# Patient Record
Sex: MALE | NOT HISPANIC OR LATINO | Employment: FULL TIME
[De-identification: names, ages, dates, MRNs, and addresses within clinical notes are randomized per-mention and may not be internally consistent; named-entity substitution may affect disease eponyms.]

---

## 2020-03-02 ENCOUNTER — HEALTH MAINTENANCE LETTER (OUTPATIENT)
Age: 50
End: 2020-03-02

## 2020-12-14 ENCOUNTER — HEALTH MAINTENANCE LETTER (OUTPATIENT)
Age: 50
End: 2020-12-14

## 2021-04-18 ENCOUNTER — HEALTH MAINTENANCE LETTER (OUTPATIENT)
Age: 51
End: 2021-04-18

## 2021-10-02 ENCOUNTER — HEALTH MAINTENANCE LETTER (OUTPATIENT)
Age: 51
End: 2021-10-02

## 2022-05-14 ENCOUNTER — HEALTH MAINTENANCE LETTER (OUTPATIENT)
Age: 52
End: 2022-05-14

## 2022-09-03 ENCOUNTER — HEALTH MAINTENANCE LETTER (OUTPATIENT)
Age: 52
End: 2022-09-03

## 2023-06-03 ENCOUNTER — HEALTH MAINTENANCE LETTER (OUTPATIENT)
Age: 53
End: 2023-06-03

## 2023-06-25 ENCOUNTER — APPOINTMENT (OUTPATIENT)
Dept: CT IMAGING | Facility: CLINIC | Age: 53
End: 2023-06-25
Attending: EMERGENCY MEDICINE
Payer: COMMERCIAL

## 2023-06-25 ENCOUNTER — HOSPITAL ENCOUNTER (EMERGENCY)
Facility: CLINIC | Age: 53
Discharge: HOME | End: 2023-06-25
Attending: EMERGENCY MEDICINE | Admitting: EMERGENCY MEDICINE
Payer: COMMERCIAL

## 2023-06-25 VITALS
OXYGEN SATURATION: 96 % | TEMPERATURE: 97.7 F | DIASTOLIC BLOOD PRESSURE: 73 MMHG | HEART RATE: 61 BPM | RESPIRATION RATE: 16 BRPM | SYSTOLIC BLOOD PRESSURE: 116 MMHG

## 2023-06-25 DIAGNOSIS — G44.209 ACUTE NON INTRACTABLE TENSION-TYPE HEADACHE: ICD-10-CM

## 2023-06-25 LAB
ANION GAP SERPL CALCULATED.3IONS-SCNC: 10 MMOL/L (ref 7–15)
BASOPHILS # BLD AUTO: 0.1 10E3/UL (ref 0–0.2)
BASOPHILS NFR BLD AUTO: 1 %
BUN SERPL-MCNC: 19 MG/DL (ref 6–20)
CALCIUM SERPL-MCNC: 8.6 MG/DL (ref 8.6–10)
CHLORIDE SERPL-SCNC: 99 MMOL/L (ref 98–107)
CREAT SERPL-MCNC: 1.09 MG/DL (ref 0.67–1.17)
DEPRECATED HCO3 PLAS-SCNC: 25 MMOL/L (ref 22–29)
EOSINOPHIL # BLD AUTO: 0.2 10E3/UL (ref 0–0.7)
EOSINOPHIL NFR BLD AUTO: 4 %
ERYTHROCYTE [DISTWIDTH] IN BLOOD BY AUTOMATED COUNT: 11.8 % (ref 10–15)
GFR SERPL CREATININE-BSD FRML MDRD: 81 ML/MIN/1.73M2
GLUCOSE SERPL-MCNC: 139 MG/DL (ref 70–99)
HCT VFR BLD AUTO: 40.6 % (ref 40–53)
HGB BLD-MCNC: 13.7 G/DL (ref 13.3–17.7)
IMM GRANULOCYTES # BLD: 0 10E3/UL
IMM GRANULOCYTES NFR BLD: 0 %
LYMPHOCYTES # BLD AUTO: 2 10E3/UL (ref 0.8–5.3)
LYMPHOCYTES NFR BLD AUTO: 43 %
MCH RBC QN AUTO: 30.4 PG (ref 26.5–33)
MCHC RBC AUTO-ENTMCNC: 33.7 G/DL (ref 31.5–36.5)
MCV RBC AUTO: 90 FL (ref 78–100)
MONOCYTES # BLD AUTO: 0.6 10E3/UL (ref 0–1.3)
MONOCYTES NFR BLD AUTO: 12 %
NEUTROPHILS # BLD AUTO: 1.8 10E3/UL (ref 1.6–8.3)
NEUTROPHILS NFR BLD AUTO: 40 %
NRBC # BLD AUTO: 0 10E3/UL
NRBC BLD AUTO-RTO: 0 /100
PLATELET # BLD AUTO: 181 10E3/UL (ref 150–450)
POTASSIUM SERPL-SCNC: 4.1 MMOL/L (ref 3.4–5.3)
RBC # BLD AUTO: 4.5 10E6/UL (ref 4.4–5.9)
SODIUM SERPL-SCNC: 134 MMOL/L (ref 136–145)
WBC # BLD AUTO: 4.6 10E3/UL (ref 4–11)

## 2023-06-25 PROCEDURE — 96374 THER/PROPH/DIAG INJ IV PUSH: CPT | Mod: 59

## 2023-06-25 PROCEDURE — 250N000009 HC RX 250: Performed by: EMERGENCY MEDICINE

## 2023-06-25 PROCEDURE — 70496 CT ANGIOGRAPHY HEAD: CPT

## 2023-06-25 PROCEDURE — 99285 EMERGENCY DEPT VISIT HI MDM: CPT | Mod: 25

## 2023-06-25 PROCEDURE — 36415 COLL VENOUS BLD VENIPUNCTURE: CPT | Performed by: EMERGENCY MEDICINE

## 2023-06-25 PROCEDURE — 85025 COMPLETE CBC W/AUTO DIFF WBC: CPT | Performed by: EMERGENCY MEDICINE

## 2023-06-25 PROCEDURE — 70498 CT ANGIOGRAPHY NECK: CPT

## 2023-06-25 PROCEDURE — 70450 CT HEAD/BRAIN W/O DYE: CPT

## 2023-06-25 PROCEDURE — 96375 TX/PRO/DX INJ NEW DRUG ADDON: CPT | Mod: 59

## 2023-06-25 PROCEDURE — 250N000011 HC RX IP 250 OP 636: Mod: JZ | Performed by: EMERGENCY MEDICINE

## 2023-06-25 PROCEDURE — 250N000011 HC RX IP 250 OP 636: Performed by: EMERGENCY MEDICINE

## 2023-06-25 PROCEDURE — 80048 BASIC METABOLIC PNL TOTAL CA: CPT | Performed by: EMERGENCY MEDICINE

## 2023-06-25 RX ORDER — SUMATRIPTAN 25 MG/1
25 TABLET, FILM COATED ORAL
Qty: 10 TABLET | Refills: 0 | Status: SHIPPED | OUTPATIENT
Start: 2023-06-25

## 2023-06-25 RX ORDER — IOPAMIDOL 755 MG/ML
75 INJECTION, SOLUTION INTRAVASCULAR ONCE
Status: COMPLETED | OUTPATIENT
Start: 2023-06-25 | End: 2023-06-25

## 2023-06-25 RX ORDER — DIPHENHYDRAMINE HYDROCHLORIDE 50 MG/ML
25 INJECTION INTRAMUSCULAR; INTRAVENOUS EVERY 6 HOURS PRN
Status: DISCONTINUED | OUTPATIENT
Start: 2023-06-25 | End: 2023-06-25 | Stop reason: HOSPADM

## 2023-06-25 RX ORDER — IBUPROFEN 600 MG/1
600 TABLET, FILM COATED ORAL EVERY 6 HOURS PRN
Qty: 30 TABLET | Refills: 1 | Status: SHIPPED | OUTPATIENT
Start: 2023-06-25

## 2023-06-25 RX ORDER — METOCLOPRAMIDE HYDROCHLORIDE 5 MG/ML
10 INJECTION INTRAMUSCULAR; INTRAVENOUS ONCE
Status: COMPLETED | OUTPATIENT
Start: 2023-06-25 | End: 2023-06-25

## 2023-06-25 RX ORDER — KETOROLAC TROMETHAMINE 15 MG/ML
15 INJECTION, SOLUTION INTRAMUSCULAR; INTRAVENOUS ONCE
Status: COMPLETED | OUTPATIENT
Start: 2023-06-25 | End: 2023-06-25

## 2023-06-25 RX ADMIN — KETOROLAC TROMETHAMINE 15 MG: 15 INJECTION, SOLUTION INTRAMUSCULAR; INTRAVENOUS at 16:58

## 2023-06-25 RX ADMIN — DIPHENHYDRAMINE HYDROCHLORIDE 25 MG: 50 INJECTION, SOLUTION INTRAMUSCULAR; INTRAVENOUS at 16:59

## 2023-06-25 RX ADMIN — METOCLOPRAMIDE 10 MG: 5 INJECTION, SOLUTION INTRAMUSCULAR; INTRAVENOUS at 17:00

## 2023-06-25 RX ADMIN — IOPAMIDOL 75 ML: 755 INJECTION, SOLUTION INTRAVENOUS at 17:58

## 2023-06-25 RX ADMIN — SODIUM CHLORIDE 90 ML: 9 INJECTION, SOLUTION INTRAVENOUS at 17:58

## 2023-06-25 ASSESSMENT — ACTIVITIES OF DAILY LIVING (ADL): ADLS_ACUITY_SCORE: 35

## 2023-06-25 NOTE — DISCHARGE INSTRUCTIONS
We have done a CT and CT angiogram to rule out aneurysm and bleeding and this is negative.  Headaches are commonly not diagnosed in the emergency room.  Consider migraine or tension headache.  If ibuprofen or Tylenol is not enough trial Imitrex.  Please follow-up with your regular doctor if you are concerned about sleep apnea for sleep studies and further assessment.  Thanks for your patience today.

## 2023-06-25 NOTE — ED PROVIDER NOTES
History     Chief Complaint:  Headache and Vision Problem     The history is provided by the patient.      Leonidas Gonzalez is a 52 year old male who presents with a headache and vision problem. The patient provides that today he and his wife traveled back from [place] and after getting home and doing some errands, the patient sat down on his couch and suddenly started having a left sided headache blurry vision at around 1600. The patient took 3 Advil and decided to come to the ED. He has never had a headache like this before and he denies having any photosensitivity with it.    Independent Historian:   None - Patient Only    Review of External Notes:       Medications:    Aspirin 81 mg  Coenzyme Q10    Past Medical History:    Vitamin D deficiency    Past Surgical History:    Corneal surgery left  [place] teeth extraction    Physical Exam     Patient Vitals for the past 24 hrs:   BP Temp Temp src Pulse Resp SpO2   06/25/23 1633 (!) 144/85 97.7  F (36.5  C) Oral 64 16 100 %        Physical Exam  Vitals reviewed.   HENT:      Mouth/Throat:      Mouth: Mucous membranes are moist.   Eyes:      Extraocular Movements: Extraocular movements intact.      Conjunctiva/sclera: Conjunctivae normal.   Cardiovascular:      Rate and Rhythm: Normal rate.   Pulmonary:      Effort: Pulmonary effort is normal.   Musculoskeletal:         General: Normal range of motion.   Skin:     General: Skin is warm.      Capillary Refill: Capillary refill takes less than 2 seconds.   Neurological:      General: No focal deficit present.      Mental Status: He is alert and oriented to person, place, and time.   Psychiatric:         Mood and Affect: Mood normal.           Emergency Department Course     Imaging:  CTA Head Neck with Contrast   Final Result   IMPRESSION:    HEAD CT:   1.  No acute transcortical infarct, intracranial hemorrhage, or mass effect.      HEAD CTA:   1.  No proximal branch vessel occlusion, flow-limiting stenosis, aneurysm, or  vascular malformation.      NECK CTA:   1.  No flow-limiting stenosis or findings of dissection.            Head CT w/o contrast   Final Result   IMPRESSION:    HEAD CT:   1.  No acute transcortical infarct, intracranial hemorrhage, or mass effect.      HEAD CTA:   1.  No proximal branch vessel occlusion, flow-limiting stenosis, aneurysm, or vascular malformation.      NECK CTA:   1.  No flow-limiting stenosis or findings of dissection.               Report per radiology    Laboratory:  Labs Ordered and Resulted from Time of ED Arrival to Time of ED Departure   BASIC METABOLIC PANEL - Abnormal       Result Value    Sodium 134 (*)     Potassium 4.1      Chloride 99      Carbon Dioxide (CO2) 25      Anion Gap 10      Urea Nitrogen 19.0      Creatinine 1.09      Calcium 8.6      Glucose 139 (*)     GFR Estimate 81     CBC WITH PLATELETS AND DIFFERENTIAL    WBC Count 4.6      RBC Count 4.50      Hemoglobin 13.7      Hematocrit 40.6      MCV 90      MCH 30.4      MCHC 33.7      RDW 11.8      Platelet Count 181      % Neutrophils 40      % Lymphocytes 43      % Monocytes 12      % Eosinophils 4      % Basophils 1      % Immature Granulocytes 0      NRBCs per 100 WBC 0      Absolute Neutrophils 1.8      Absolute Lymphocytes 2.0      Absolute Monocytes 0.6      Absolute Eosinophils 0.2      Absolute Basophils 0.1      Absolute Immature Granulocytes 0.0      Absolute NRBCs 0.0         Emergency Department Course & Assessments:       Interventions:  Medications   diphenhydrAMINE (BENADRYL) injection 25 mg (25 mg Intravenous $Given 6/25/23 1659)   ketorolac (TORADOL) injection 15 mg (15 mg Intravenous $Given 6/25/23 1658)   metoclopramide (REGLAN) injection 10 mg (10 mg Intravenous $Given 6/25/23 1700)   Saline (90 mLs As instructed $Given 6/25/23 1758)   iopamidol (ISOVUE-370) solution 75 mL (75 mLs Intravenous $Given 6/25/23 1758)        Assessments:  1642 I obtained history and examined the patient as noted above.  1842 I  rechecked the patient and explained findings.    Independent Interpretation (X-rays, CTs, rhythm strip):  None    Consultations/Discussion of Management or Tests:  None     Social Determinants of Health affecting care:   None    Disposition:  The patient was discharged to home.     Impression & Plan      Medical Decision Making:  Patient presents with relatively sudden onset headache.  Patient denies history of headache though has had a prior CT for headache 4 years ago.  Patient has unilateral headache there is no concerns for meningitis patient is not at risk for CVT but due to sudden nature headache CT and CT angio recommended for assessment for aneurysm or bleeding and this was negative.  Patient seemed improved with Reglan and Benadryl suspect migraine component due to the vision complaints associated with unilateral headache encouraged follow-up with neurology or primary care and was discharged home with a trial of Imitrex if NSAIDs do not help.  Patient was discharged home in stable condition wife did ask about sleep apnea recommended outpatient assessment for this.  Diagnosis:    ICD-10-CM    1. Acute non intractable tension-type headache  G44.209            Discharge Medications:  Discharge Medication List as of 6/25/2023  6:53 PM      START taking these medications    Details   ibuprofen (ADVIL/MOTRIN) 600 MG tablet Take 1 tablet (600 mg) by mouth every 6 hours as needed for moderate pain, Disp-30 tablet, R-1, Local Print      SUMAtriptan (IMITREX) 25 MG tablet Take 1 tablet (25 mg) by mouth at onset of headache for migraine May repeat in 2 hours. Max 8 tablets/24 hours., Disp-10 tablet, R-0, Local Print              Scribe Disclosure:  I, Ricci Ridley, am serving as a scribe at 4:53 PM on 6/25/2023 to document services personally performed by Tyler Trejo MD based on my observations and the provider's statements to me.       Tyler Trejo MD  06/25/23 [id number]

## 2023-06-25 NOTE — ED TRIAGE NOTES
At 1500 patient started to have a headache and blurry vision. Patient states he has never had a headache like this before. Neuros intact.  Patient took 600mg of ibuprofen at 1610.

## 2023-12-09 ENCOUNTER — HEALTH MAINTENANCE LETTER (OUTPATIENT)
Age: 53
End: 2023-12-09

## 2025-01-11 ENCOUNTER — HEALTH MAINTENANCE LETTER (OUTPATIENT)
Age: 55
End: 2025-01-11